# Patient Record
Sex: FEMALE | Race: OTHER
[De-identification: names, ages, dates, MRNs, and addresses within clinical notes are randomized per-mention and may not be internally consistent; named-entity substitution may affect disease eponyms.]

---

## 2018-08-07 ENCOUNTER — HOSPITAL ENCOUNTER (INPATIENT)
Dept: HOSPITAL 36 - ER | Age: 58
LOS: 4 days | Discharge: NURSING, REHAB OR LONG-TERM CARE | DRG: 720 | End: 2018-08-11
Attending: INTERNAL MEDICINE | Admitting: INTERNAL MEDICINE
Payer: MEDICAID

## 2018-08-07 DIAGNOSIS — E11.9: ICD-10-CM

## 2018-08-07 DIAGNOSIS — Z88.5: ICD-10-CM

## 2018-08-07 DIAGNOSIS — Z88.0: ICD-10-CM

## 2018-08-07 DIAGNOSIS — E87.0: ICD-10-CM

## 2018-08-07 DIAGNOSIS — E86.0: ICD-10-CM

## 2018-08-07 DIAGNOSIS — N28.9: ICD-10-CM

## 2018-08-07 DIAGNOSIS — E44.1: ICD-10-CM

## 2018-08-07 DIAGNOSIS — N39.0: ICD-10-CM

## 2018-08-07 DIAGNOSIS — Z87.11: ICD-10-CM

## 2018-08-07 DIAGNOSIS — K21.9: ICD-10-CM

## 2018-08-07 DIAGNOSIS — G40.909: ICD-10-CM

## 2018-08-07 DIAGNOSIS — B96.4: ICD-10-CM

## 2018-08-07 DIAGNOSIS — I25.10: ICD-10-CM

## 2018-08-07 DIAGNOSIS — I48.91: ICD-10-CM

## 2018-08-07 DIAGNOSIS — F03.90: ICD-10-CM

## 2018-08-07 DIAGNOSIS — M81.0: ICD-10-CM

## 2018-08-07 DIAGNOSIS — Z23: ICD-10-CM

## 2018-08-07 DIAGNOSIS — A41.9: Primary | ICD-10-CM

## 2018-08-07 DIAGNOSIS — D50.9: ICD-10-CM

## 2018-08-07 DIAGNOSIS — I10: ICD-10-CM

## 2018-08-07 DIAGNOSIS — G80.9: ICD-10-CM

## 2018-08-07 DIAGNOSIS — F32.9: ICD-10-CM

## 2018-08-07 DIAGNOSIS — K27.9: ICD-10-CM

## 2018-08-07 DIAGNOSIS — Z82.49: ICD-10-CM

## 2018-08-07 LAB
ALBUMIN SERPL-MCNC: 3.2 GM/DL (ref 3.7–5.3)
ALBUMIN/GLOB SERPL: 0.7 {RATIO} (ref 1–1.8)
ALP SERPL-CCNC: 72 U/L (ref 34–104)
ALT SERPL-CCNC: 15 U/L (ref 7–52)
AMYLASE SERPL-CCNC: 35 U/L (ref 29–103)
ANION GAP SERPL CALC-SCNC: 11.1 MMOL/L (ref 7–16)
APPEARANCE UR: (no result)
AST SERPL-CCNC: 13 U/L (ref 13–39)
BACTERIA #/AREA URNS HPF: (no result) /HPF
BASOPHILS # BLD AUTO: 0.1 TH/CUMM (ref 0–0.2)
BASOPHILS NFR BLD AUTO: 1.2 % (ref 0–2)
BILIRUB SERPL-MCNC: 0.5 MG/DL (ref 0.3–1)
BILIRUB UR-MCNC: NEGATIVE MG/DL
BUN SERPL-MCNC: 79 MG/DL (ref 7–25)
CALCIUM SERPL-MCNC: 9.9 MG/DL (ref 8.6–10.3)
CHLORIDE SERPL-SCNC: 108 MEQ/L (ref 98–107)
CK SERPL-CCNC: 11 U/L (ref 30–223)
CO2 SERPL-SCNC: 34 MEQ/L (ref 21–31)
COLOR UR: YELLOW
CREAT SERPL-MCNC: 2.7 MG/DL (ref 0.6–1.2)
EOSINOPHIL # BLD AUTO: 0 TH/CMM (ref 0.1–0.4)
EOSINOPHIL NFR BLD AUTO: 1 % (ref 0–5)
EPI CELLS URNS QL MICRO: (no result) /LPF
ERYTHROCYTE [DISTWIDTH] IN BLOOD BY AUTOMATED COUNT: 12.8 % (ref 11.5–20)
GLOBULIN SER-MCNC: 4.3 GM/DL
GLUCOSE SERPL-MCNC: 138 MG/DL (ref 70–105)
GLUCOSE UR STRIP-MCNC: NEGATIVE MG/DL
HCT VFR BLD CALC: 33.2 % (ref 41–60)
HGB BLD-MCNC: 11.3 GM/DL (ref 12–16)
INR PPP: 0.95 (ref 0.5–1.4)
KETONES UR STRIP-MCNC: NEGATIVE MG/DL
LEUKOCYTE ESTERASE UR-ACNC: (no result)
LIPASE SERPL-CCNC: 26 U/L (ref 11–82)
LYMPHOCYTE AB SER FC-ACNC: 0.7 TH/CMM (ref 1.5–3)
LYMPHOCYTES NFR BLD AUTO: 15.8 % (ref 20–50)
MCH RBC QN AUTO: 33 PG (ref 27–31)
MCHC RBC AUTO-ENTMCNC: 33.9 PG (ref 28–36)
MCV RBC AUTO: 97.4 FL (ref 81–100)
MICRO URNS: YES
MONOCYTES # BLD AUTO: 0.4 TH/CMM (ref 0.3–1)
MONOCYTES NFR BLD AUTO: 8.7 % (ref 2–10)
NEUTROPHILS # BLD: 3.1 TH/CMM (ref 1.8–8)
NEUTROPHILS NFR BLD AUTO: 73.3 % (ref 40–80)
NITRITE UR QL STRIP: NEGATIVE
PH UR STRIP: 8.5 [PH] (ref 4.6–8)
PLATELET # BLD: 88 TH/CMM (ref 150–400)
PMV BLD AUTO: 11.5 FL
POTASSIUM SERPL-SCNC: 4.1 MEQ/L (ref 3.5–5.1)
PROT UR STRIP-MCNC: 100 MG/DL
PROTHROMBIN TIME: 9.9 SECONDS (ref 9.5–11.5)
RBC # BLD AUTO: 3.41 MIL/CMM (ref 3.8–5.1)
RBC # UR STRIP: (no result) /UL
RBC #/AREA URNS HPF: (no result) /HPF (ref 0–5)
SODIUM SERPL-SCNC: 149 MEQ/L (ref 136–145)
SP GR UR STRIP: 1.01 (ref 1–1.03)
TROPONIN I SERPL-MCNC: 0.02 NG/ML (ref 0.01–0.05)
URINALYSIS COMPLETE PNL UR: (no result)
UROBILINOGEN UR STRIP-ACNC: 0.2 E.U./DL (ref 0.2–1)
WBC # BLD AUTO: 4.3 TH/CMM (ref 4.8–10.8)

## 2018-08-07 PROCEDURE — A4217 STERILE WATER/SALINE, 500 ML: HCPCS

## 2018-08-07 PROCEDURE — Z7610: HCPCS

## 2018-08-07 RX ADMIN — DEXTROSE AND SODIUM CHLORIDE SCH MLS/HR: 5; .45 INJECTION, SOLUTION INTRAVENOUS at 23:28

## 2018-08-07 NOTE — ED PHYSICIAN CHART
ED Chief Complaint/HPI





- Patient Information


Date Seen:: 08/07/18


Time Seen:: 17:00


Chief Complaint:: Fever


History of Present Illness:: 





onset x 2 days of fever, chills, weakness, and palpitations; no report of trauma

, H/As, S/T, neck pain, C/P, SOB, Abd. Pain, A/N/V/D/C, or urinary s/s


Allergies:: 


 Allergies











Allergy/AdvReac Type Severity Reaction Status Date / Time


 


codeine Allergy   Verified 08/07/18 16:57


 


Penicillins [PCN] Allergy   Verified 08/07/18 16:57











Historian:: Patient, EMS


Review:: Nurse's Note Reviewed, Old Chart Reviewed, EMS run form Reviewed





<Kevin Zaidi - Last Filed: 08/07/18 17:46>





- Patient Information


Allergies:: 


 Allergies











Allergy/AdvReac Type Severity Reaction Status Date / Time


 


codeine Allergy   Verified 08/07/18 16:57


 


Penicillins [PCN] Allergy   Verified 08/07/18 16:57














<Jennifer Azul - Last Filed: 08/10/18 20:23>





ED Review of Systems





- Review of Systems


General/Constitutional: Fever, Chills, No weight loss, Weakness, No diaphoresis

, No edema, No loss of appetite


Skin: No skin lesions, No rash, No bruising


Head: No headache, No light-headedness


Eyes: No loss of vision, No pain, No diplopia


ENT: No earache, No nasal drainage, No sore throat, No tinnitus


Neck: No neck pain, No swelling, No thyromegaly, No stiffness, No mass noted


Cardio Vascular: No chest pain, Palpitations, No PND, No orthopnea, No edema


Pulmonary: No SOB, No cough, No sputum, No wheezing


GI: No nausea, No vomiting, No diarrhea, No pain, No melena, No hematochezia, 

No constipation, No hematemesis


G/U: No dysuria, No frequency, No hematuria, No nacturia


Ob/Gyn: No vaginal discharge, No abnormal vaginal bleed, No contraction


Musculoskeletal: No bone or joint pain, No back pain, No muscle pain


Endocrine: No polyuria, No polydipsia


Psychiatric: No prior psych history, No depression, No anxiety, No suicidal 

ideation, No homicidal ideation, No auditory hallucination, No visual 

hallucination


Hematopoietic: No bruising, No lymphadenopathy


Allergic/Immuno: No urticaria, No angioedema


Neurological: No syncope, No focal symptoms, Weakness, No paresthesia, No 

headache, No seizure, No dizziness, Confusion, No vertigo





<Kevin Zaidi - Last Filed: 08/07/18 17:46>





ED Past Medical History





- Past Medical History


Obtainable: Yes


Past Medical History: HTN, DM, CAD, PUD/GERD, Seizures, Dementia, Other (CP)


Family History: HTN


Social History: Non Smoker, No Alcohol, No Drug Use, Single, Care Facility


Surgical History: PEG/GTube


Psychiatricy History: Dementia


Medication: Reviewed





<Kevin Zaidi - Last Filed: 08/07/18 17:46>





Family Medical History





- Family Member


  ** Mother


History Unknown: Yes





<Kevin Zaidi - Last Filed: 08/07/18 17:46>





ED Physical Exam





- Physical Examination


General/Constitutional: Awake, Well-developed, well-nourished, Alert, No 

distress, GCS 15, Non-toxic appearing, Ambulatory


Head: Atraumatic


Eyes: Lids, conjuctiva normal, PERRL, EOMI


Skin: Nl inspection, No rash, No skin lesions, No ecchymosis, Well hydrated, No 

lymphadenopathy


ENMT: External ears, nose nl, TM canals nl, Nasal exam nl, Lips, teeth, gums nl

, Oropharynx nl, Tonsils nl


Neck: Nontender, Full ROM w/o pain, No JVD, No nuchal rigidity, No bruit, No 

mass, No stridor


Respiratory: Nl effort/Exclusion, Clear to Auscultation, No Wheeze/Rhonchi/Rales


Cardio Vascular: No murmur, gallop, rubs, NL S1 S2, Carotid/Femoral/Distal 

pulses equal bilaterally


Other Cardio Vascular comments:: 





Irregular Irregular Rhythm


GI: No tenderness/rebounding/guarding, No organomegaly, No hernia, Normal BS's, 

Nondistended, No mass/bruits, No McBurney tenderness


: No CVA tenderness


Extremities: No tenderness or effusion, Full ROM, normal strength in all 

extremities, No edema, Normal digits & nails


Neuro/Psych: Alert/oriented, DTR's symmetric, Normal sensory exam, Normal motor 

strength, Judgement/insight normal, Mood normal, Normal gait, No focal deficits


Misc: Normal back, No paraspinal tenderness





<Kevin Zaidi - Last Filed: 08/07/18 17:46>





ED Labs/Radiology/EKG Results





- EKG Interpretations


EKG Time:: 17:14


Rate & Rhythm: 147; Atrial Fibrillation


Comments:: 





non-specific st-t changes





<Kevin Zaidi - Last Filed: 08/07/18 17:46>





- Lab Results


Results: 


 Laboratory Tests











  08/07/18 08/07/18 08/07/18





  16:48 17:25 17:25


 


WBC   4.3 L 


 


RBC   3.41 L 


 


Hgb   11.3 L 


 


Hct   33.2 L 


 


MCV   97.4 


 


MCH   33.0 H 


 


MCHC Differential   33.9 


 


RDW   12.8 


 


Plt Count   88 L 


 


MPV   11.5 


 


Neutrophils %   73.3 


 


Lymphocytes %   15.8 L 


 


Monocytes %   8.7 


 


Eosinophils %   1.0 


 


Basophils %   1.2 


 


PT    9.9


 


INR    0.95


 


PTT (Actin FS)    30.3


 


Sodium   


 


Potassium   


 


Chloride   


 


Carbon Dioxide   


 


Anion Gap   


 


BUN   


 


Creatinine   


 


Est GFR ( Amer)   


 


Est GFR (Non-Af Amer)   


 


BUN/Creatinine Ratio   


 


Glucose   


 


Whole Bld Lactic Acid   


 


Calcium   


 


Total Bilirubin   


 


AST   


 


ALT   


 


Alkaline Phosphatase   


 


Creatine Kinase   


 


Troponin I   


 


Total Protein   


 


Albumin   


 


Globulin   


 


Albumin/Globulin Ratio   


 


Amylase   


 


Lipase   


 


Serum Pregnancy, Qual  NEGATIVE  


 


Urine Source   


 


Urine Color   


 


Urine Clarity   


 


Urine pH   


 


Ur Specific Gravity   


 


Urine Protein   


 


Urine Glucose (UA)   


 


Urine Ketones   


 


Urine Blood   


 


Urine Nitrate   


 


Urine Bilirubin   


 


Urine Urobilinogen   


 


Ur Leukocyte Esterase   


 


Urine RBC   


 


Urine WBC   


 


Ur Epithelial Cells   


 


Urine Bacteria   














  08/07/18 08/07/18 08/07/18





  17:25 17:25 17:53


 


WBC   


 


RBC   


 


Hgb   


 


Hct   


 


MCV   


 


MCH   


 


MCHC Differential   


 


RDW   


 


Plt Count   


 


MPV   


 


Neutrophils %   


 


Lymphocytes %   


 


Monocytes %   


 


Eosinophils %   


 


Basophils %   


 


PT   


 


INR   


 


PTT (Actin FS)   


 


Sodium  149 H  


 


Potassium  4.1  


 


Chloride  108 H  


 


Carbon Dioxide  34.0 H  


 


Anion Gap  11.1  


 


BUN  79 H  


 


Creatinine  2.7 H  


 


Est GFR ( Amer)  23.4  


 


Est GFR (Non-Af Amer)  19.3  


 


BUN/Creatinine Ratio  29.3  


 


Glucose  138 H  


 


Whole Bld Lactic Acid   1.17 


 


Calcium  9.9  


 


Total Bilirubin  0.5  


 


AST  13  


 


ALT  15  


 


Alkaline Phosphatase  72  


 


Creatine Kinase  11 L  


 


Troponin I   0.02 


 


Total Protein  7.5  


 


Albumin  3.2 L  


 


Globulin  4.3  


 


Albumin/Globulin Ratio  0.7 L  


 


Amylase    35


 


Lipase    26


 


Serum Pregnancy, Qual   


 


Urine Source   


 


Urine Color   


 


Urine Clarity   


 


Urine pH   


 


Ur Specific Gravity   


 


Urine Protein   


 


Urine Glucose (UA)   


 


Urine Ketones   


 


Urine Blood   


 


Urine Nitrate   


 


Urine Bilirubin   


 


Urine Urobilinogen   


 


Ur Leukocyte Esterase   


 


Urine RBC   


 


Urine WBC   


 


Ur Epithelial Cells   


 


Urine Bacteria   














  08/07/18





  19:30


 


WBC 


 


RBC 


 


Hgb 


 


Hct 


 


MCV 


 


MCH 


 


MCHC Differential 


 


RDW 


 


Plt Count 


 


MPV 


 


Neutrophils % 


 


Lymphocytes % 


 


Monocytes % 


 


Eosinophils % 


 


Basophils % 


 


PT 


 


INR 


 


PTT (Actin FS) 


 


Sodium 


 


Potassium 


 


Chloride 


 


Carbon Dioxide 


 


Anion Gap 


 


BUN 


 


Creatinine 


 


Est GFR ( Amer) 


 


Est GFR (Non-Af Amer) 


 


BUN/Creatinine Ratio 


 


Glucose 


 


Whole Bld Lactic Acid 


 


Calcium 


 


Total Bilirubin 


 


AST 


 


ALT 


 


Alkaline Phosphatase 


 


Creatine Kinase 


 


Troponin I 


 


Total Protein 


 


Albumin 


 


Globulin 


 


Albumin/Globulin Ratio 


 


Amylase 


 


Lipase 


 


Serum Pregnancy, Qual 


 


Urine Source  CLEAN C


 


Urine Color  YELLOW


 


Urine Clarity  HAZY


 


Urine pH  8.5


 


Ur Specific Gravity  1.015


 


Urine Protein  100 H


 


Urine Glucose (UA)  NEGATIVE


 


Urine Ketones  NEGATIVE


 


Urine Blood  LARGE H


 


Urine Nitrate  NEGATIVE


 


Urine Bilirubin  NEGATIVE


 


Urine Urobilinogen  0.2


 


Ur Leukocyte Esterase  LARGE H


 


Urine RBC  5-10 H


 


Urine WBC  10-25 H


 


Ur Epithelial Cells  FEW


 


Urine Bacteria  3+ H














- Radiology Results


Results: 


CXR: left lower lung basal density, increased lung markings, cardiomegaly





<Jennifer Azul - Last Filed: 08/10/18 20:23>





ED Septic Shock





- .


Is Septic Shock (SBP<90, OR Lactate>4 mmol\L) present?: No





<Kevin Zaidi - Last Filed: 08/07/18 17:46>





- .


Is Septic Shock (SBP<90, OR Lactate>4 mmol\L) present?: No





<Jennifer Azul - Last Filed: 08/10/18 20:23>





ED Reassessment (Disposition)





- Reassessment


Reassessment Condition:: Improved





- Diagnosis


Diagnosis:: 





Fever; Palpitations; Atrial Fibrillation; Dehydration; Leukopenia; Anemia; Pre-

Renal Azotemia; Hypernatremia





<Kevin Zaidi - Last Filed: 08/07/18 17:46>





- Reassessment


Reassessment:: 


Urinary tract infection





- Patient Disposition


Discharge/Transfer:: Acute Care w/in this hosp


Admitting Medical Physician:: Santy Finn





<Jennifer Azul - Last Filed: 08/10/18 20:23>

## 2018-08-08 VITALS — DIASTOLIC BLOOD PRESSURE: 67 MMHG | SYSTOLIC BLOOD PRESSURE: 116 MMHG

## 2018-08-08 RX ADMIN — Medication SCH TAB: at 09:04

## 2018-08-08 RX ADMIN — PANTOPRAZOLE SODIUM SCH MG: 40 GRANULE, DELAYED RELEASE ORAL at 13:35

## 2018-08-08 RX ADMIN — DEXTROSE AND SODIUM CHLORIDE SCH MLS/HR: 5; .45 INJECTION, SOLUTION INTRAVENOUS at 12:24

## 2018-08-08 RX ADMIN — DEXTROSE AND SODIUM CHLORIDE SCH MLS/HR: 5; .45 INJECTION, SOLUTION INTRAVENOUS at 21:22

## 2018-08-08 RX ADMIN — DILTIAZEM HYDROCHLORIDE SCH MG: 30 TABLET, FILM COATED ORAL at 17:10

## 2018-08-08 NOTE — INTERNAL MEDICINE PROG NOTE
Internal Medicine Subjective





- Subjective


Service Date: 08/08/18 ([id number])





Internal Medicine Objective





- Results


Result Diagrams: 


 08/07/18 17:25





 08/07/18 17:25


Recent Labs: 


 Laboratory Last Values











WBC  4.3 Th/cmm (4.8-10.8)  L  08/07/18  17:25    


 


RBC  3.41 Mil/cmm (3.80-5.10)  L  08/07/18  17:25    


 


Hgb  11.3 gm/dL (12-16)  L  08/07/18  17:25    


 


Hct  33.2 % (41.0-60)  L  08/07/18  17:25    


 


MCV  97.4 fl ()   08/07/18  17:25    


 


MCH  33.0 pg (27.0-31.0)  H  08/07/18  17:25    


 


MCHC Differential  33.9 pg (28.0-36.0)   08/07/18  17:25    


 


RDW  12.8 % (11.5-20.0)   08/07/18  17:25    


 


Plt Count  88 Th/cmm (150-400)  L  08/07/18  17:25    


 


MPV  11.5 fl  08/07/18  17:25    


 


Neutrophils %  73.3 % (40.0-80.0)   08/07/18  17:25    


 


Lymphocytes %  15.8 % (20.0-50.0)  L  08/07/18  17:25    


 


Monocytes %  8.7 % (2.0-10.0)   08/07/18  17:25    


 


Eosinophils %  1.0 % (0.0-5.0)   08/07/18  17:25    


 


Basophils %  1.2 % (0.0-2.0)   08/07/18  17:25    


 


PT  9.9 SECONDS (9.5-11.5)   08/07/18  17:25    


 


INR  0.95  (0.5-1.4)   08/07/18  17:25    


 


PTT (Actin FS)  30.3 SECONDS (26.0-38.0)   08/07/18  17:25    


 


Sodium  149 mEq/L (136-145)  H  08/07/18  17:25    


 


Potassium  4.1 mEq/L (3.5-5.1)   08/07/18  17:25    


 


Chloride  108 mEq/L ()  H  08/07/18  17:25    


 


Carbon Dioxide  34.0 mEq/L (21.0-31.0)  H  08/07/18  17:25    


 


Anion Gap  11.1  (7.0-16.0)   08/07/18  17:25    


 


BUN  79 mg/dL (7-25)  H  08/07/18  17:25    


 


Creatinine  2.7 mg/dL (0.6-1.2)  H  08/07/18  17:25    


 


Est GFR ( Amer)  23.4 ml/min (>90)   08/07/18  17:25    


 


Est GFR (Non-Af Amer)  19.3 ml/min  08/07/18  17:25    


 


BUN/Creatinine Ratio  29.3   08/07/18  17:25    


 


Glucose  138 mg/dL ()  H  08/07/18  17:25    


 


Whole Bld Lactic Acid  1.17 mmol/L (0.60-1.99)   08/07/18  17:25    


 


Calcium  9.9 mg/dL (8.6-10.3)   08/07/18  17:25    


 


Total Bilirubin  0.5 mg/dL (0.3-1.0)   08/07/18  17:25    


 


AST  13 U/L (13-39)   08/07/18  17:25    


 


ALT  15 U/L (7-52)   08/07/18  17:25    


 


Alkaline Phosphatase  72 U/L ()   08/07/18  17:25    


 


Creatine Kinase  11 U/L ()  L  08/07/18  17:25    


 


Troponin I  0.02 ng/mL (0.01-0.05)   08/07/18  17:25    


 


Total Protein  7.5 gm/dL (6.0-8.3)   08/07/18  17:25    


 


Albumin  3.2 gm/dL (3.7-5.3)  L  08/07/18  17:25    


 


Globulin  4.3 gm/dL  08/07/18  17:25    


 


Albumin/Globulin Ratio  0.7  (1.0-1.8)  L  08/07/18  17:25    


 


Amylase  35 U/L ()   08/07/18  17:53    


 


Lipase  26 U/L (11-82)   08/07/18  17:53    


 


Serum Pregnancy, Qual  NEGATIVE  (NEGATIVE)   08/07/18  16:48    


 


Urine Source  CLEAN C   08/07/18  19:30    


 


Urine Color  YELLOW   08/07/18  19:30    


 


Urine Clarity  HAZY  (CLEAR)   08/07/18  19:30    


 


Urine pH  8.5  (4.6 - 8.0)   08/07/18  19:30    


 


Ur Specific Gravity  1.015  (1.005-1.030)   08/07/18  19:30    


 


Urine Protein  100 mg/dL (NEGATIVE)  H  08/07/18  19:30    


 


Urine Glucose (UA)  NEGATIVE mg/dL (NEGATIVE)   08/07/18  19:30    


 


Urine Ketones  NEGATIVE mg/dL (NEGATIVE)   08/07/18  19:30    


 


Urine Blood  LARGE  (NEGATIVE)  H  08/07/18  19:30    


 


Urine Nitrate  NEGATIVE  (NEGATIVE)   08/07/18  19:30    


 


Urine Bilirubin  NEGATIVE  (NEGATIVE)   08/07/18  19:30    


 


Urine Urobilinogen  0.2 E.U./dL (0.2 - 1.0)   08/07/18  19:30    


 


Ur Leukocyte Esterase  LARGE  (NEGATIVE)  H  08/07/18  19:30    


 


Urine RBC  5-10 /hpf (0-5)  H  08/07/18  19:30    


 


Urine WBC  10-25 /hpf (0-5)  H  08/07/18  19:30    


 


Ur Epithelial Cells  FEW /lpf (FEW)   08/07/18  19:30    


 


Urine Bacteria  3+ /hpf (NONE SEEN)  H  08/07/18  19:30    














- Physical Exam


Vitals and I&O: 


 Vital Signs











Temp  97.0 F   08/08/18 12:00


 


Pulse  118   08/08/18 12:00


 


Resp  17   08/08/18 12:00


 


BP  117/78   08/08/18 12:00


 


Pulse Ox  98   08/08/18 12:00








 Intake & Output











 08/07/18 08/08/18 08/08/18





 18:59 06:59 18:59


 


Intake Total  150 1000


 


Balance  150 1000


 


Weight (lbs) 185 lb 188 lb 


 


Intake:   


 


  Intake, IV Amount  150 1000


 


    D5-0.45NS 1,000 ml @ 100   1000





    mls/hr IV .Q10H JO Rx#:   





    [id number]   


 


    Levofloxacin 750mg/150mL  150 





    750 mg In 150 ml @ 100   





    mls/hr IV X1 ONE Rx#:   





    [id number]   


 


Other:   


 


  Weight Source Patient stated Patient stated 











Active Medications: 


Current Medications





Acetaminophen (Tylenol)  650 mg PO Q4H PRN


   PRN Reason: PAIN/TEM>100


   Stop: 10/06/18 21:08


Acetaminophen (Tylenol)  650 mg PO Q4H PRN


   PRN Reason: Pain Or Fever above 101


   Stop: 10/06/18 20:46


Al Hydrox/Mg Hydrox/Simethicone (Maalox)  30 ml PO Q6H PRN


   PRN Reason: Dyspepsia


   Stop: 10/06/18 20:43


Albuterol Sulfate (Albuterol 2.5mg/3ml Neb Ud)  2.5 mg HHN Q2HRT PRN


   PRN Reason: Shortness of Breath or Wheeze


   Stop: 10/06/18 20:43


Diltiazem HCl (Cardizem)  90 mg PO Q8HR [facility]


   Stop: 10/07/18 20:59


Docusate Sodium (Colace)  100 mg PO BID [facility]


   Stop: 10/07/18 08:59


   Last Admin: 08/08/18 09:04 Dose:  100 mg


Ferrous Sulfate (Iron)  450 mg GT DAILY [facility]


   Stop: 10/07/18 13:59


Guaifenesin (Robitussin)  200 mg PO Q4HR PRN


   PRN Reason: Cough or Congestion


   Stop: 10/06/18 20:43


Levofloxacin (Levaquin Pb)  500 mg in 100 mls @ 100 mls/hr IV Q48HR [facility]


   Stop: 10/08/18 19:59


Dextrose/Sodium Chloride (D5-0.45ns)  1,000 mls @ 100 mls/hr IV .Q10H JO


   Stop: 10/06/18 20:59


   Last Admin: 08/08/18 12:24 Dose:  100 mls/hr


Lacosamide (Vimpat)  200 mg GT BID [facility]


   Stop: 10/07/18 16:59


Lorazepam (Ativan)  1 mg IV Q4H PRN; Protocol


   PRN Reason: Seizure


   Stop: 10/06/18 20:43


Ondansetron HCl (Zofran)  4 mg IV Q8H PRN


   PRN Reason: Nausea / Vomiting


   Stop: 10/06/18 20:46


Pantoprazole Sodium (Protonix)  40 mg GT DAILY JO


   Stop: 10/07/18 13:59


Quetiapine Fumarate (Seroquel)  25 mg GT DAILY JO; Protocol


   Stop: 10/07/18 08:59


   Last Admin: 08/08/18 09:04 Dose:  25 mg


Senna (Senna)  17.2 mg GT HS JO


   Stop: 10/06/18 20:59


   Last Admin: 08/07/18 23:29 Dose:  17.2 mg

## 2018-08-08 NOTE — DIAGNOSTIC IMAGING REPORT
CHEST X-RAY: AP view



INDICATION: Fever



COMPARISON: None



FINDINGS: Increased interstitial lung markings are noted suggestive of

chronic lung changes.  Left lower lungs with subsegmental atelectasis is

noted.  Slight increased left basal density is noted.  Suboptimal lung

volume the noted.  Cardiomegaly is noted with atherosclerosis. 

Degenerative changes of spine are noted with scoliosis.  Degenerative

changes of the shoulders are also noted.



IMPRESSION:



Increased interstitial lung markings suggestive of chronic lung changes.

 No evidence of CHF.



Left lower lung zone subsegmental atelectasis versus scarring.



Left basal density probably related to superimposition of soft tissues. 

A small left pleural effusion is considered less likely.  Faint 

Infiltrate is also considered less likely.



Cardiomegaly and atherosclerotic vascular disease.

## 2018-08-08 NOTE — CONSULTATION
DATE OF CONSULTATION:  08/07/2018



The patient of Dr. Finn.



HISTORY AND PHYSICAL:  This is a 57-year-old female patient with cerebral palsy,

came to the Emergency Room because of weakness, tiredness, fever.  The patient

had uncontrolled atrial fibrillation and hence cardiac consult is requested. 

The patient is a poor historian.



PAST MEDICAL HISTORY:  Cerebral palsy, hypertension and atrial fibrillation,

peptic ulcer disease, seizure disorder, cerebral palsy, dementia, anemia.



FAMILY HISTORY:  Unremarkable.



SOCIAL HISTORY:  No history of smoking, alcohol abuse.



ALLERGIES:  No known allergies.



PHYSICAL EXAMINATION:

VITAL SIGNS:  Blood pressure 120/80; pulse 130, irregular; and respirations 28.

HEAD:  Normocephalic.  No lumps or bumps.

EYES:  Pupils equal, reactive to light.  Fundi show AV nicking, sclerae white,

conjunctivae pink.

NECK:  Carotid 2+.  Normal upstroke.  JVD flat.  Thyroid not palpable.

LYMPH NODES:  Not palpable.

CHEST:  Shows increased AP diameter.  No kyphosis, scoliosis.

LUNGS:  Bilateral rales.  Decreased breath sounds in both bases.

HEART:  PMI sixth intercostal space with lateral-to-midclavicular line.

EXTREMITIES:  Peripheral pulse is 1+, pedal edema 1+.



CLINICAL DIAGNOSES:

1.  Atrial fibrillation with rapid ventricular response.

2.  Cerebral palsy.

3.  Hypertension, peptic ulcer disease, seizure disorder, depression,

iron-deficiency anemia, osteoporosis.



PLAN:  The patient to continue present medication.  Continue present care.  The

patient will be given Cardizem, Lopressor IV for controlling the blood pressure,

also get an echocardiogram.





DD: 08/08/2018 14:26

DT: 08/08/2018 22:33

JOB# [id number]  [id number]

## 2018-08-08 NOTE — HISTORY & PHYSICAL
ADMIT DATE:  08/08/2018



CHIEF COMPLAINT:  Fever.



HISTORY OF PRESENT ILLNESS:  This is a 57-year-old female who is a resident of

[facility] who has a 2-day history of fevers associated with

weakness. For further management, the patient is now admitted here to the

telemetry unit.



PAST MEDICAL HISTORY:  Hypertension, diabetes, CAD, GERD, seizures, dementia,

cerebral palsy, and AFib.



FAMILY HISTORY:  Noncontributory.



SOCIAL HISTORY:  The patient is a nursing home resident requiring 24-hour

nursing care.



PAST SURGICAL HISTORY:  PEG.



MEDICATIONS:  Please see medication list.



REVIEW OF SYSTEMS:  Unable to obtain, the patient is nonverbal.



PHYSICAL EXAMINATION:

GENERAL:  The patient is well-developed, well-nourished, no apparent distress.

VITAL SIGNS:  Temperature 97.0, heart rate 118, blood pressure 117/78,

respirations 17, O2 98%.

HEENT:  Head normocephalic, atraumatic.

NECK:  Supple.  No mass.

LUNGS:  Clear bilaterally.

HEART:  Regular rate and rhythm. 

ABDOMEN:  Soft, nontender, nondistended.



LABORATORY DATA:  WBC 4.3, H and H 11.3 and 33.2, platelet 88.  Sodium 149,

potassium 4.1, chloride 108, BUN 79, creatinine 2.7, glucose of 138.



The patient had a urinalysis done, positive for UTI.



DIAGNOSTICS:  The patient had a chest x-ray done, impression is increased

interstitial lung marking suggestive of chronic lung changes, no evidence of

CHF, left lower lung zones subsegmental atelectasis versus scarring, left basal

density probably related to superimposition of the soft tissue.  Small left

pleural effusion considered less likely. Faint infiltrate is also considered

less likely, cardiomegaly and atherosclerotic vascular disease.



ASSESSMENT:  Acute urinary tract infection, sepsis, possible pneumonia,

tachycardia, history of atrial fibrillation, acute renal insufficiency, rule out

dehydration, leukopenia, acute urinary tract infection, mild protein-calorie

malnutrition, cerebral palsy, hypertension, diabetes, GERD, seizures, dementia.



PLAN:  The patient will be admitted to the telemetry unit.  We will get

cardiologist on the case due to aggressive IV fluids for hydration.  If the

patient's BUN and creatinine does not improve with IV fluids, we will add

Nephrology on the case.  In the meantime, we will order a renal ultrasound.  We

will keep the patient on empiric IV antibiotics of Levaquin.  We will send urine

for culture.  We will continue to follow this patient.





DD: 08/08/2018 13:32

DT: 08/08/2018 14:50

[facility]# [id number]  [id number]

## 2018-08-09 LAB
ANION GAP SERPL CALC-SCNC: 8 MMOL/L (ref 7–16)
BASOPHILS # BLD AUTO: 0 TH/CUMM (ref 0–0.2)
BASOPHILS NFR BLD AUTO: 0.6 % (ref 0–2)
BUN SERPL-MCNC: 67 MG/DL (ref 7–25)
CALCIUM SERPL-MCNC: 9.3 MG/DL (ref 8.6–10.3)
CHLORIDE SERPL-SCNC: 112 MEQ/L (ref 98–107)
CO2 SERPL-SCNC: 31 MEQ/L (ref 21–31)
CREAT SERPL-MCNC: 2 MG/DL (ref 0.6–1.2)
EOSINOPHIL # BLD AUTO: 0.2 TH/CMM (ref 0.1–0.4)
EOSINOPHIL NFR BLD AUTO: 3.5 % (ref 0–5)
ERYTHROCYTE [DISTWIDTH] IN BLOOD BY AUTOMATED COUNT: 12.6 % (ref 11.5–20)
GLUCOSE SERPL-MCNC: 136 MG/DL (ref 70–105)
HCT VFR BLD CALC: 28.2 % (ref 41–60)
HGB BLD-MCNC: 9.5 GM/DL (ref 12–16)
LYMPHOCYTE AB SER FC-ACNC: 0.5 TH/CMM (ref 1.5–3)
LYMPHOCYTES NFR BLD AUTO: 11.2 % (ref 20–50)
MAGNESIUM SERPL-MCNC: 2.2 MG/DL (ref 1.9–2.7)
MCH RBC QN AUTO: 33.3 PG (ref 27–31)
MCHC RBC AUTO-ENTMCNC: 33.9 PG (ref 28–36)
MCV RBC AUTO: 98.3 FL (ref 81–100)
MONOCYTES # BLD AUTO: 0.2 TH/CMM (ref 0.3–1)
MONOCYTES NFR BLD AUTO: 4.8 % (ref 2–10)
NEUTROPHILS # BLD: 4 TH/CMM (ref 1.8–8)
NEUTROPHILS NFR BLD AUTO: 79.9 % (ref 40–80)
PLATELET # BLD: 95 TH/CMM (ref 150–400)
PMV BLD AUTO: 12.8 FL
POTASSIUM SERPL-SCNC: 4 MEQ/L (ref 3.5–5.1)
RBC # BLD AUTO: 2.86 MIL/CMM (ref 3.8–5.1)
SODIUM SERPL-SCNC: 147 MEQ/L (ref 136–145)
WBC # BLD AUTO: 4.9 TH/CMM (ref 4.8–10.8)

## 2018-08-09 RX ADMIN — DILTIAZEM HYDROCHLORIDE SCH MG: 30 TABLET, FILM COATED ORAL at 18:15

## 2018-08-09 RX ADMIN — Medication SCH TAB: at 08:32

## 2018-08-09 RX ADMIN — DILTIAZEM HYDROCHLORIDE SCH MG: 30 TABLET, FILM COATED ORAL at 06:01

## 2018-08-09 RX ADMIN — DILTIAZEM HYDROCHLORIDE SCH MG: 30 TABLET, FILM COATED ORAL at 13:48

## 2018-08-09 RX ADMIN — DEXTROSE AND SODIUM CHLORIDE SCH MLS/HR: 5; .45 INJECTION, SOLUTION INTRAVENOUS at 13:56

## 2018-08-09 RX ADMIN — DEXTROSE AND SODIUM CHLORIDE SCH: 5; .45 INJECTION, SOLUTION INTRAVENOUS at 08:33

## 2018-08-09 RX ADMIN — PANTOPRAZOLE SODIUM SCH MG: 40 GRANULE, DELAYED RELEASE ORAL at 08:32

## 2018-08-09 RX ADMIN — DILTIAZEM HYDROCHLORIDE SCH MG: 30 TABLET, FILM COATED ORAL at 00:59

## 2018-08-09 NOTE — INTERNAL MEDICINE PROG NOTE
Internal Medicine Subjective





- Subjective


Service Date: 18


Patient seen and examined:: with staff


Patient is:: awake, non-verbal


Per staff patient has:: tolerating meds





Internal Medicine Objective





- Results


Result Diagrams: 


 18 04:45





 18 04:45


Recent Labs: 


 Laboratory Last Values











WBC  4.9 Th/cmm (4.8-10.8)   18  04:45    


 


RBC  2.86 Mil/cmm (3.80-5.10)  L  18  04:45    


 


Hgb  9.5 gm/dL (12-16)  L  18  04:45    


 


Hct  28.2 % (41.0-60)  L  18  04:45    


 


MCV  98.3 fl ()   18  04:45    


 


MCH  33.3 pg (27.0-31.0)  H  18  04:45    


 


MCHC Differential  33.9 pg (28.0-36.0)   18  04:45    


 


RDW  12.6 % (11.5-20.0)   18  04:45    


 


Plt Count  95 Th/cmm (150-400)  L  18  04:45    


 


MPV  12.8 fl  18  04:45    


 


Neutrophils %  79.9 % (40.0-80.0)   18  04:45    


 


Lymphocytes %  11.2 % (20.0-50.0)  L  18  04:45    


 


Monocytes %  4.8 % (2.0-10.0)   18  04:45    


 


Eosinophils %  3.5 % (0.0-5.0)   18  04:45    


 


Basophils %  0.6 % (0.0-2.0)   18  04:45    


 


PT  9.9 SECONDS (9.5-11.5)   18  17:25    


 


INR  0.95  (0.5-1.4)   18  17:25    


 


PTT (Actin FS)  30.3 SECONDS (26.0-38.0)   18  17:25    


 


Sodium  147 mEq/L (136-145)  H  18  04:45    


 


Potassium  4.0 mEq/L (3.5-5.1)   18  04:45    


 


Chloride  112 mEq/L ()  H  18  04:45    


 


Carbon Dioxide  31.0 mEq/L (21.0-31.0)   18  04:45    


 


Anion Gap  8.0  (7.0-16.0)   18  04:45    


 


BUN  67 mg/dL (7-25)  H  18  04:45    


 


Creatinine  2.0 mg/dL (0.6-1.2)  H  18  04:45    


 


Est GFR ( Amer)  33.0 ml/min (>90)   18  04:45    


 


Est GFR (Non-Af Amer)  27.3 ml/min  18  04:45    


 


BUN/Creatinine Ratio  33.5   18  04:45    


 


Glucose  136 mg/dL ()  H  18  04:45    


 


Whole Bld Lactic Acid  1.17 mmol/L (0.60-1.99)   18  17:25    


 


Calcium  9.3 mg/dL (8.6-10.3)   18  04:45    


 


Magnesium  2.2 mg/dL (1.9-2.7)   18  04:45    


 


Total Bilirubin  0.5 mg/dL (0.3-1.0)   18  17:25    


 


AST  13 U/L (13-39)   18  17:25    


 


ALT  15 U/L (7-52)   18  17:25    


 


Alkaline Phosphatase  72 U/L ()   18  17:25    


 


Ammonia  39 umol/L (16-53)   18  06:00    


 


Creatine Kinase  11 U/L ()  L  18  17:25    


 


Troponin I  0.02 ng/mL (0.01-0.05)   18  17:25    


 


B-Natriuretic Peptide  188.0 pg/mL (5.0-100.0)  H  18  04:45    


 


Total Protein  7.5 gm/dL (6.0-8.3)   18  17:25    


 


Albumin  3.2 gm/dL (3.7-5.3)  L  18  17:25    


 


Globulin  4.3 gm/dL  18  17:25    


 


Albumin/Globulin Ratio  0.7  (1.0-1.8)  L  18  17:25    


 


Amylase  35 U/L ()   18  17:53    


 


Lipase  26 U/L (11-82)   18  17:53    


 


Serum Pregnancy, Qual  NEGATIVE  (NEGATIVE)   18  16:48    


 


Urine Source  CLEAN C   18  19:30    


 


Urine Color  YELLOW   18  19:30    


 


Urine Clarity  HAZY  (CLEAR)   18  19:30    


 


Urine pH  8.5  (4.6 - 8.0)   18  19:30    


 


Ur Specific Gravity  1.015  (1.005-1.030)   18  19:30    


 


Urine Protein  100 mg/dL (NEGATIVE)  H  18  19:30    


 


Urine Glucose (UA)  NEGATIVE mg/dL (NEGATIVE)   18  19:30    


 


Urine Ketones  NEGATIVE mg/dL (NEGATIVE)   18  19:30    


 


Urine Blood  LARGE  (NEGATIVE)  H  18  19:30    


 


Urine Nitrate  NEGATIVE  (NEGATIVE)   18  19:30    


 


Urine Bilirubin  NEGATIVE  (NEGATIVE)   18  19:30    


 


Urine Urobilinogen  0.2 E.U./dL (0.2 - 1.0)   18  19:30    


 


Ur Leukocyte Esterase  LARGE  (NEGATIVE)  H  18  19:30    


 


Urine RBC  5-10 /hpf (0-5)  H  18  19:30    


 


Urine WBC  10-25 /hpf (0-5)  H  18  19:30    


 


Ur Epithelial Cells  FEW /lpf (FEW)   18  19:30    


 


Urine Bacteria  3+ /hpf (NONE SEEN)  H  18  19:30    














- Physical Exam


Vitals and I&O: 


 Vital Signs











Temp  97.0 F   18 11:59


 


Pulse  65   18 11:59


 


Resp  18   18 11:59


 


BP  112/63   18 11:59


 


Pulse Ox  93   18 11:59








 Intake & Output











 18





 18:59 06:59 18:59


 


Intake Total 2323.333 288.333 520


 


Balance 2323.333 288.333 520


 


Weight (lbs) 188 lb  188 lb


 


Intake:   


 


  Intake, IV Amount 1608.333 288.333 


 


    D5-0.45NS 1,000 ml @ 100 1608.333 288.333 





    mls/hr IV .Q10H [facility] Rx#:   





    [id number]   


 


  Tube Feeding 715  520


 


Other:   


 


  # Voids 3  3


 


  Weight Source Estimated  Bedscale











Active Medications: 


Current Medications





Acetaminophen (Tylenol)  650 mg PO Q4H PRN


   PRN Reason: PAIN/TEMP >100


   Stop: 10/06/18 21:08


Al Hydrox/Mg Hydrox/Simethicone (Maalox)  30 ml PO Q6H PRN


   PRN Reason: Dyspepsia


   Stop: 10/06/18 20:43


Albuterol Sulfate (Albuterol 2.5mg/3ml Neb Ud)  2.5 mg HHN Q2HRT PRN


   PRN Reason: Shortness of Breath or Wheeze


   Stop: 10/06/18 20:43


Amiodarone HCl (Cordarone)  200 mg PO DAILY [facility]


   Stop: 10/09/18 08:59


Diltiazem HCl (Cardizem)  60 mg PO Q6HR [facility]


   Stop: 10/07/18 17:59


   Last Admin: 18 06:01 Dose:  60 mg


Docusate Sodium (Colace)  100 mg PO BID [facility]


   Stop: 10/07/18 08:59


   Last Admin: 18 08:32 Dose:  100 mg


Ferrous Sulfate (Iron)  450 mg GT DAILY [facility]


   Stop: 10/07/18 13:59


   Last Admin: 18 08:31 Dose:  450 mg


Guaifenesin (Robitussin)  200 mg PO Q4HR PRN


   PRN Reason: Cough or Congestion


   Stop: 10/06/18 20:43


Levofloxacin (Levaquin Pb)  500 mg in 100 mls @ 100 mls/hr IV Q48HR [facility]


   Stop: 10/08/18 19:59


Dextrose/Sodium Chloride (D5-0.45ns)  1,000 mls @ 100 mls/hr IV .Q10H [facility]


   Stop: 10/06/18 20:59


   Last Admin: 18 08:33 Dose:  Not Given


Lacosamide (Vimpat)  200 mg GT BID [facility]


   Stop: 10/07/18 16:59


   Last Admin: 18 08:32 Dose:  200 mg


Lorazepam (Ativan)  1 mg IV Q4H PRN; Protocol


   PRN Reason: Seizure


   Stop: 10/06/18 20:43


Metoprolol Tartrate (Lopressor)  5 mg IV Q4HR PRN


   PRN Reason: Tachycardia HR above 120


   Stop: 10/07/18 15:59


Miscellaneous (Clinical Monitoring)  1 ea MC DAILY PRN


   PRN Reason: RENAL


   Stop: 10/07/18 15:45


Ondansetron HCl (Zofran)  4 mg IV Q8H PRN


   PRN Reason: Nausea / Vomiting


   Stop: 10/06/18 20:46


Pantoprazole Sodium (Protonix)  40 mg GT DAILY [facility]


   Stop: 10/07/18 13:59


   Last Admin: 18 08:32 Dose:  40 mg


Quetiapine Fumarate (Seroquel)  25 mg GT DAILY JO; Protocol


   Stop: 10/07/18 08:59


   Last Admin: 18 08:32 Dose:  25 mg


Senna (Senna)  17.2 mg GT HS JO


   Stop: 10/06/18 20:59


   Last Admin: 18 21:06 Dose:  17.2 mg








General: weak


HEENT: NC/AT, PERRLA


Neck: Supple


Lungs: CTAB


Cardiovascular: RRR, Normal S1, Normal S2, without murmur


Abdomen: soft, non-tender, non-distended, positive bowel sound


Extremities: excoriation


Neurological: unable to follow command





Internal Medicine Assmt/Plan





- Assessment


Assessment: 





acute uti


sepsis


possible pna


tachycardia


hx of afib


acute renal insufficiency r/o dehydration


leukopenia


mild protein erin malnutrition


cerebral palsy 


htn


dm


gerd


seizures


dementia








- Plan


Plan: 





contiue ivf for hydration


monitor HR


continue ivabx


follow up labs in am


continue current plan of care 





Nutritional Asmnt/Malnutr-PDOC





- Dietary Evaluation


Malnutrition Findings (Please click <Entered> for more info): 








Nutritional Asmnt/Malnutrition                             Start:  18 16:

02


Text:                                                      Status: Complete    

  


Freq:                                                                          

  


Protocol:                                                                      

  


 Document     18 16:44  LCKITG  (Rec: 18 17:06  SALUD  [id number])


 Nutritional Asmnt/Malnutrition


     Patient General Information


      Nutritional Screening                      High Risk


      Diagnosis                                  UTI, Sepsis


      Pertinent Medical Hx/Surgical Hx           HTN, DM, CAD, PUD/GERD,


                                                 Seizure, demntia, CP, PEG/


                                                 Gtube


      Subjective Information                     Pt was resting in bed at time


                                                 of visit, non-verbal. TF was


                                                 off at this time. Per RN, Pt


                                                 was tolerating TF well, no


                                                 residual noted. Wilner 11


                                                 noted.


      Current Diet Order/ Nutrition Support      Osmolite 1.5 at 65ml x 20hr


      Pertinent Medications                      D5-0.45ns, colace, iron,


                                                 levaquin, protonix, seroquel,


                                                 senna


      Pertinent Labs                              Na 149, Cl 108, BUN 79, Cr


                                                 2.7, glucose 138, Alb 3.2


     Nutritional Hx/Data


      Height                                     5 ft 4 in


      Height (Calculated Centimeters)            162.6


      Current Weight (lbs)                       188 lb


      Weight (Calculated Kilograms)              85.3


      Weight (Calculated Grams)                  29982.4


      Ideal Body Weight                          120


      Body Mass Index (BMI)                      32.2


      Weight Status                              Obese


     GI Symptoms


      GI Symptoms                                None


      Last BM                                    not indicated


      Difficult in:                              None


      Usual diet at home                         osmolite 1.5 at 65ml x 20hr at


                                                 care facility per chart


      Skin Integrity/Comment:                    intact


     Estimated Nutritional Goals


      BEE in Kcals:                              Adj wt of IBW


      Calories/Kcals/Kg                          25-30 based on 62kg


      Kcals Calculated                           8442-5446


      Protein:                                   Adj wt of IBW


      Protein g/k.2 monitor renal labs


      Protein Calculated                         74


      Fluid: ml                                  1550-1860ml (1ml/kcal)


     Nutritional Problem


      1. Problem


       Problem                                   altered nutrition related lab


       Etiology                                  electrolytes/fluid imbalance


                                                 and hx of DM


       Signs/Symptoms:                           Na 149, Cl 108, BUN 79, Cr 2.7


                                                 , glucose 138


     Malnutrition Alert


      Is there a minimum of two criteria         No


       selected?                                 


       Query Text:Check all the applicable       


       criteria. A minimum of two criteria are   


       recommended for diagnosis of either       


       severe or non-severe malnutrition.        


     Malnutrition Related to Morbid Obesity


      Malnutrition related to morbid obesity     No


     Intervention/Recommendation


      Comments                                   1. Continue with current TF


                                                 regimen. It provides 1950kcal,


                                                 82g protein, 990ml free water


                                                 , meeting 100% of nutritional


                                                 needs. If BUN continue high,


                                                 will consider lower TF rate.


                                                 2. Monitor TF rate, tolerance,


                                                 wt, skin integrity and labs


                                                 3. F/U as high risk in 2-3


                                                 days, 8/10-


     Expected Outcomes/Goals


      Expected Outcomes/Goals                    1. Pt to meet at least 75% of


                                                 nutritional needs via


                                                 nutrition support with


                                                 tolerance


                                                 2. Wt stability, skin to


                                                 remain intact, labs to


                                                 approach WNL.

## 2018-08-10 LAB
ANION GAP SERPL CALC-SCNC: 7.5 MMOL/L (ref 7–16)
BASOPHILS # BLD AUTO: 0 TH/CUMM (ref 0–0.2)
BASOPHILS NFR BLD AUTO: 0.8 % (ref 0–2)
BUN SERPL-MCNC: 54 MG/DL (ref 7–25)
CALCIUM SERPL-MCNC: 9.6 MG/DL (ref 8.6–10.3)
CHLORIDE SERPL-SCNC: 113 MEQ/L (ref 98–107)
CO2 SERPL-SCNC: 30.3 MEQ/L (ref 21–31)
CREAT SERPL-MCNC: 1.7 MG/DL (ref 0.6–1.2)
EOSINOPHIL # BLD AUTO: 0.2 TH/CMM (ref 0.1–0.4)
EOSINOPHIL NFR BLD AUTO: 2.7 % (ref 0–5)
ERYTHROCYTE [DISTWIDTH] IN BLOOD BY AUTOMATED COUNT: 12.1 % (ref 11.5–20)
GLUCOSE SERPL-MCNC: 115 MG/DL (ref 70–105)
HCT VFR BLD CALC: 29.1 % (ref 41–60)
HGB BLD-MCNC: 10 GM/DL (ref 12–16)
LYMPHOCYTE AB SER FC-ACNC: 0.6 TH/CMM (ref 1.5–3)
LYMPHOCYTES NFR BLD AUTO: 9.7 % (ref 20–50)
MCH RBC QN AUTO: 33.5 PG (ref 27–31)
MCHC RBC AUTO-ENTMCNC: 34.3 PG (ref 28–36)
MCV RBC AUTO: 97.7 FL (ref 81–100)
MONOCYTES # BLD AUTO: 0.2 TH/CMM (ref 0.3–1)
MONOCYTES NFR BLD AUTO: 2.8 % (ref 2–10)
NEUTROPHILS # BLD: 4.9 TH/CMM (ref 1.8–8)
NEUTROPHILS NFR BLD AUTO: 84 % (ref 40–80)
PLATELET # BLD: 129 TH/CMM (ref 150–400)
PMV BLD AUTO: 11.9 FL
POTASSIUM SERPL-SCNC: 3.8 MEQ/L (ref 3.5–5.1)
RBC # BLD AUTO: 2.98 MIL/CMM (ref 3.8–5.1)
SODIUM SERPL-SCNC: 147 MEQ/L (ref 136–145)
WBC # BLD AUTO: 5.9 TH/CMM (ref 4.8–10.8)

## 2018-08-10 RX ADMIN — DILTIAZEM HYDROCHLORIDE SCH MG: 30 TABLET, FILM COATED ORAL at 23:46

## 2018-08-10 RX ADMIN — Medication SCH TAB: at 08:42

## 2018-08-10 RX ADMIN — PANTOPRAZOLE SODIUM SCH MG: 40 GRANULE, DELAYED RELEASE ORAL at 08:43

## 2018-08-10 RX ADMIN — DEXTROSE AND SODIUM CHLORIDE SCH MLS/HR: 5; .45 INJECTION, SOLUTION INTRAVENOUS at 05:54

## 2018-08-10 RX ADMIN — DILTIAZEM HYDROCHLORIDE SCH MG: 30 TABLET, FILM COATED ORAL at 05:51

## 2018-08-10 RX ADMIN — DEXTROSE AND SODIUM CHLORIDE SCH MLS/HR: 5; .45 INJECTION, SOLUTION INTRAVENOUS at 12:58

## 2018-08-10 RX ADMIN — DILTIAZEM HYDROCHLORIDE SCH MG: 30 TABLET, FILM COATED ORAL at 00:30

## 2018-08-10 RX ADMIN — DILTIAZEM HYDROCHLORIDE SCH MG: 30 TABLET, FILM COATED ORAL at 17:15

## 2018-08-10 RX ADMIN — DILTIAZEM HYDROCHLORIDE SCH MG: 30 TABLET, FILM COATED ORAL at 12:13

## 2018-08-10 NOTE — INTERNAL MEDICINE PROG NOTE
Internal Medicine Subjective





- Subjective


Patient is:: awake, non-verbal


Per staff patient has:: tolerating meds





Internal Medicine Objective





- Results


Result Diagrams: 


 08/10/18 05:50





 08/10/18 05:50


Recent Labs: 


 Laboratory Last Values











WBC  5.9 Th/cmm (4.8-10.8)   08/10/18  05:50    


 


RBC  2.98 Mil/cmm (3.80-5.10)  L  08/10/18  05:50    


 


Hgb  10.0 gm/dL (12-16)  L  08/10/18  05:50    


 


Hct  29.1 % (41.0-60)  L  08/10/18  05:50    


 


MCV  97.7 fl ()   08/10/18  05:50    


 


MCH  33.5 pg (27.0-31.0)  H  08/10/18  05:50    


 


MCHC Differential  34.3 pg (28.0-36.0)   08/10/18  05:50    


 


RDW  12.1 % (11.5-20.0)   08/10/18  05:50    


 


Plt Count  129 Th/cmm (150-400)  L  08/10/18  05:50    


 


MPV  11.9 fl  08/10/18  05:50    


 


Neutrophils %  84.0 % (40.0-80.0)  H  08/10/18  05:50    


 


Lymphocytes %  9.7 % (20.0-50.0)  L  08/10/18  05:50    


 


Monocytes %  2.8 % (2.0-10.0)   08/10/18  05:50    


 


Eosinophils %  2.7 % (0.0-5.0)   08/10/18  05:50    


 


Basophils %  0.8 % (0.0-2.0)   08/10/18  05:50    


 


PT  9.9 SECONDS (9.5-11.5)   18  17:25    


 


INR  0.95  (0.5-1.4)   18  17:25    


 


PTT (Actin FS)  30.3 SECONDS (26.0-38.0)   18  17:25    


 


Sodium  147 mEq/L (136-145)  H  08/10/18  05:50    


 


Potassium  3.8 mEq/L (3.5-5.1)   08/10/18  05:50    


 


Chloride  113 mEq/L ()  H  08/10/18  05:50    


 


Carbon Dioxide  30.3 mEq/L (21.0-31.0)   08/10/18  05:50    


 


Anion Gap  7.5  (7.0-16.0)   08/10/18  05:50    


 


BUN  54 mg/dL (7-25)  H  08/10/18  05:50    


 


Creatinine  1.7 mg/dL (0.6-1.2)  H  08/10/18  05:50    


 


Est GFR ( Amer)  39.8 ml/min (>90)   08/10/18  05:50    


 


Est GFR (Non-Af Amer)  32.9 ml/min  08/10/18  05:50    


 


BUN/Creatinine Ratio  31.8   08/10/18  05:50    


 


Glucose  115 mg/dL ()  H  08/10/18  05:50    


 


Whole Bld Lactic Acid  1.17 mmol/L (0.60-1.99)   18  17:25    


 


Calcium  9.6 mg/dL (8.6-10.3)   08/10/18  05:50    


 


Magnesium  2.2 mg/dL (1.9-2.7)   18  04:45    


 


Total Bilirubin  0.5 mg/dL (0.3-1.0)   18  17:25    


 


AST  13 U/L (13-39)   18  17:25    


 


ALT  15 U/L (7-52)   18  17:25    


 


Alkaline Phosphatase  72 U/L ()   18  17:25    


 


Ammonia  39 umol/L (16-53)   18  06:00    


 


Creatine Kinase  11 U/L ()  L  18  17:25    


 


Troponin I  0.02 ng/mL (0.01-0.05)   18  17:25    


 


B-Natriuretic Peptide  188.0 pg/mL (5.0-100.0)  H  18  04:45    


 


Total Protein  7.5 gm/dL (6.0-8.3)   18  17:25    


 


Albumin  3.2 gm/dL (3.7-5.3)  L  18  17:25    


 


Globulin  4.3 gm/dL  18  17:25    


 


Albumin/Globulin Ratio  0.7  (1.0-1.8)  L  18  17:25    


 


Amylase  35 U/L ()   18  17:53    


 


Lipase  26 U/L (11-82)   18  17:53    


 


Serum Pregnancy, Qual  NEGATIVE  (NEGATIVE)   18  16:48    


 


Urine Source  CLEAN C   18  19:30    


 


Urine Color  YELLOW   18  19:30    


 


Urine Clarity  HAZY  (CLEAR)   18  19:30    


 


Urine pH  8.5  (4.6 - 8.0)   18  19:30    


 


Ur Specific Gravity  1.015  (1.005-1.030)   18  19:30    


 


Urine Protein  100 mg/dL (NEGATIVE)  H  18  19:30    


 


Urine Glucose (UA)  NEGATIVE mg/dL (NEGATIVE)   18  19:30    


 


Urine Ketones  NEGATIVE mg/dL (NEGATIVE)   18  19:30    


 


Urine Blood  LARGE  (NEGATIVE)  H  18  19:30    


 


Urine Nitrate  NEGATIVE  (NEGATIVE)   18  19:30    


 


Urine Bilirubin  NEGATIVE  (NEGATIVE)   18  19:30    


 


Urine Urobilinogen  0.2 E.U./dL (0.2 - 1.0)   18  19:30    


 


Ur Leukocyte Esterase  LARGE  (NEGATIVE)  H  18  19:30    


 


Urine RBC  5-10 /hpf (0-5)  H  18  19:30    


 


Urine WBC  10-25 /hpf (0-5)  H  18  19:30    


 


Ur Epithelial Cells  FEW /lpf (FEW)   18  19:30    


 


Urine Bacteria  3+ /hpf (NONE SEEN)  H  18  19:30    


 


Random Vancomycin  19.1 ug/mL (5.0-40.0)   08/10/18  05:50    














- Physical Exam


Vitals and I&O: 


 Vital Signs











Temp  97.6 F   08/10/18 04:00


 


Pulse  108   08/10/18 12:13


 


Resp  18   08/10/18 07:16


 


BP  142/74   08/10/18 12:13


 


Pulse Ox  96   08/10/18 07:16








 Intake & Output











 08/09/18 08/10/18 08/10/18





 18:59 06:59 18:59


 


Intake Total 1520 1455 


 


Balance 1520 1455 


 


Weight (lbs) 188 lb 195 lb 


 


Intake:   


 


  Intake, IV Amount 1000 1000 


 


    D5-0.45NS 1,000 ml @ 100 1000 1000 





    mls/hr IV .Q10H [facility] Rx#:   





    [id number]   


 


  Tube Feeding 520 455 


 


Other:   


 


  # Voids 3 3 


 


  Weight Source Bedscale Bedscale 











Active Medications: 


Current Medications





Acetaminophen (Tylenol)  650 mg PO Q4H PRN


   PRN Reason: PAIN/TEMP >100


   Stop: 10/06/18 21:08


Al Hydrox/Mg Hydrox/Simethicone (Maalox)  30 ml PO Q6H PRN


   PRN Reason: Dyspepsia


   Stop: 10/06/18 20:43


Albuterol Sulfate (Albuterol 2.5mg/3ml Neb Ud)  2.5 mg HHN Q2HRT PRN


   PRN Reason: Shortness of Breath or Wheeze


   Stop: 10/06/18 20:43


Amiodarone HCl (Cordarone)  200 mg PO DAILY [facility]


   Stop: 10/09/18 08:59


   Last Admin: 08/10/18 08:42 Dose:  200 mg


Carvedilol (Coreg)  6.25 mg PO DAILY [facility]


   Stop: 10/09/18 11:59


   Last Admin: 08/10/18 12:13 Dose:  6.25 mg


Diltiazem HCl (Cardizem)  60 mg PO Q6HR [facility]


   Stop: 10/07/18 17:59


   Last Admin: 08/10/18 12:13 Dose:  60 mg


Docusate Sodium (Colace)  100 mg PO BID [facility]


   Stop: 10/07/18 08:59


   Last Admin: 08/10/18 08:43 Dose:  100 mg


Ferrous Sulfate (Iron)  450 mg GT DAILY [facility]


   Stop: 10/07/18 13:59


   Last Admin: 08/10/18 08:43 Dose:  450 mg


Guaifenesin (Robitussin)  200 mg PO Q4HR PRN


   PRN Reason: Cough or Congestion


   Stop: 10/06/18 20:43


Vancomycin HCl 1 gm/ Sodium (Chloride)  250 mls @ 165 mls/hr IV ONCE ONE


   Stop: 08/10/18 13:30


   Last Admin: 08/10/18 11:07 Dose:  165 mls/hr


Dextrose/Sodium Chloride (D5-0.45ns)  1,000 mls @ 70 mls/hr IV .A50H20K [facility]


   Stop: 10/09/18 12:44


Lacosamide (Vimpat)  200 mg GT BID [facility]


   Stop: 10/07/18 16:59


   Last Admin: 08/10/18 08:43 Dose:  200 mg


Lorazepam (Ativan)  1 mg IV Q4H PRN; Protocol


   PRN Reason: Seizure


   Stop: 10/06/18 20:43


Metoprolol Tartrate (Lopressor)  5 mg IV Q4HR PRN


   PRN Reason: Tachycardia HR above 120


   Stop: 10/07/18 15:59


Miscellaneous (Clinical Monitoring)  1 [facility] DAILY PRN


   PRN Reason: RENAL


   Stop: 10/07/18 15:45


Ondansetron HCl (Zofran)  4 mg IV Q8H PRN


   PRN Reason: Nausea / Vomiting


   Stop: 10/06/18 20:46


Pantoprazole Sodium (Protonix)  40 mg GT DAILY [facility]


   Stop: 10/07/18 13:59


   Last Admin: 08/10/18 08:43 Dose:  40 mg


Quetiapine Fumarate (Seroquel)  25 mg GT DAILY [facility]; Protocol


   Stop: 10/07/18 08:59


   Last Admin: 08/10/18 08:42 Dose:  25 mg


Senna (Senna)  17.2 mg GT HS [facility]


   Stop: 10/06/18 20:59


   Last Admin: 18 21:44 Dose:  17.2 mg


Trimethoprim/Sulfamethoxazole (Bactrim Iv Per Pharmacy)  1 [facility] PRN JO


   Stop: 10/09/18 12:44








General: weak


HEENT: NC/AT, PERRLA


Neck: Supple


Lungs: CTAB


Cardiovascular: RRR, Normal S1, Normal S2, without murmur


Abdomen: soft, non-tender, non-distended, positive bowel sound


Extremities: excoriation


Neurological: unable to follow command





Internal Medicine Assmt/Plan





- Assessment


Assessment: 





acute uti with proteus mirabilis esbl and provedencia stuariti 


sepsis


possible pna


tachycardia


hx of afib


acute renal insufficiency r/o dehydration


leukopenia


mild protein erin malnutrition


cerebral palsy 


htn


dm


gerd


seizures


dementia








- Plan


Plan: 


switch ivabx to bactrim 


contiue ivf for hydration


monitor HR


follow up labs in am


continue current plan of care 





Nutritional Asmnt/Malnutr-PDOC





- Dietary Evaluation


Malnutrition Findings (Please click <Entered> for more info): 








Nutritional Asmnt/Malnutrition                             Start:  18 16:

02


Text:                                                      Status: Complete    

  


Freq:                                                                          

  


Protocol:                                                                      

  


 Document     18 16:44  LCKITG  (Rec: 18 17:06  [email or web address] [id number])


 Nutritional Asmnt/Malnutrition


     Patient General Information


      Nutritional Screening                      High Risk


      Diagnosis                                  UTI, Sepsis


      Pertinent Medical Hx/Surgical Hx           HTN, DM, CAD, PUD/GERD,


                                                 Seizure, demntia, CP, PEG/


                                                 Gtube


      Subjective Information                     Pt was resting in bed at time


                                                 of visit, non-verbal. TF was


                                                 off at this time. Per RN, Pt


                                                 was tolerating TF well, no


                                                 residual noted. Wilner 11


                                                 noted.


      Current Diet Order/ Nutrition Support      Osmolite 1.5 at 65ml x 20hr


      Pertinent Medications                      D5-0.45ns, colace, iron,


                                                 levaquin, protonix, seroquel,


                                                 senna


      Pertinent Labs                              Na 149, Cl 108, BUN 79, Cr


                                                 2.7, glucose 138, Alb 3.2


     Nutritional Hx/Data


      Height                                     5 ft 4 in


      Height (Calculated Centimeters)            162.6


      Current Weight (lbs)                       188 lb


      Weight (Calculated Kilograms)              85.3


      Weight (Calculated Grams)                  95461.4


      Ideal Body Weight                          120


      Body Mass Index (BMI)                      32.2


      Weight Status                              Obese


     GI Symptoms


      GI Symptoms                                None


      Last BM                                    not indicated


      Difficult in:                              None


      Usual diet at home                         osmolite 1.5 at 65ml x 20hr at


                                                 care facility per chart


      Skin Integrity/Comment:                    intact


     Estimated Nutritional Goals


      BEE in Kcals:                              Adj wt of IBW


      Calories/Kcals/Kg                          25-30 based on 62kg


      Kcals Calculated                           [phone]


      Protein:                                   Adj wt of IBW


      Protein g/k.2 monitor renal labs


      Protein Calculated                         74


      Fluid: ml                                  1550-1860ml (1ml/kcal)


     Nutritional Problem


      1. Problem


       Problem                                   altered nutrition related lab


       Etiology                                  electrolytes/fluid imbalance


                                                 and hx of DM


       Signs/Symptoms:                           Na 149, Cl 108, BUN 79, Cr 2.7


                                                 , glucose 138


     Malnutrition Alert


      Is there a minimum of two criteria         No


       selected?                                 


       Query Text:Check all the applicable       


       criteria. A minimum of two criteria are   


       recommended for diagnosis of either       


       severe or non-severe malnutrition.        


     Malnutrition Related to Morbid Obesity


      Malnutrition related to morbid obesity     No


     Intervention/Recommendation


      Comments                                   1. Continue with current TF


                                                 regimen. It provides 1950kcal,


                                                 82g protein, 990ml free water


                                                 , meeting 100% of nutritional


                                                 needs. If BUN continue high,


                                                 will consider lower TF rate.


                                                 2. Monitor TF rate, tolerance,


                                                 wt, skin integrity and labs


                                                 3. F/U as high risk in 2-3


                                                 days, 8/10-


     Expected Outcomes/Goals


      Expected Outcomes/Goals                    1. Pt to meet at least 75% of


                                                 nutritional needs via


                                                 nutrition support with


                                                 tolerance


                                                 2. Wt stability, skin to


                                                 remain intact, labs to


                                                 approach WNL.

## 2018-08-10 NOTE — CARDIOLOGY
08/09/2018



The patient of Dr. Santy Finn.



M-MODE ECHOCARDIOGRAM:  Mitral valve, anterior leaflet of mitral valve shows

normal excursion, EF velocity.  Posterior leaflet of the mitral valve shows

normal excursion.  Left ventricular posterior wall shows increased thickness,

normal excursion.  Interventricular septum shows increased thickness, normal

excursion, hypertrophy of the left ventricle, ejection fraction 50%.  Left

atrium enlarged 5.5 cm.  Aortic root shows normal dimension, normal excursion of

aortic leaflets.



CONCLUSION:  Hypertrophy of the left ventricle, ejection fraction 50%.  Left

atrial enlargement.



2D ECHO:  Long axis view showed normal-sized left ventricle with hypertrophy of

the left ventricle.  Left atrium enlarged.  Aortic root shows normal dimension,

normal excursion of aortic leaflets.  Short axis view of mitral valve normal. 

Short axis view of aortic valve normal.  Apical four chamber view showed

normal-sized left ventricle with hypertrophy of the left ventricle.  Left atrium

enlarged.  Right ventricular cavity, right atrium normal, no pericardial

effusion.  Ejection fraction 58%.



CONCLUSION:  Hypertrophy of the left ventricle, ejection fraction 50%.  Left

atrial enlargement.



Doppler study shows mild mitral regurgitation, mild tricuspid regurgitation,

right ventricular systolic pressure 44 mmHg.





DD: 08/10/2018 09:48

DT: 08/10/2018 11:51

JOB# [id number]  [id number]

## 2018-08-11 LAB
ANION GAP SERPL CALC-SCNC: 7.6 MMOL/L (ref 7–16)
BUN SERPL-MCNC: 48 MG/DL (ref 7–25)
CALCIUM SERPL-MCNC: 9.4 MG/DL (ref 8.6–10.3)
CHLORIDE SERPL-SCNC: 111 MEQ/L (ref 98–107)
CO2 SERPL-SCNC: 29.5 MEQ/L (ref 21–31)
CREAT SERPL-MCNC: 1.5 MG/DL (ref 0.6–1.2)
GLUCOSE SERPL-MCNC: 133 MG/DL (ref 70–105)
POTASSIUM SERPL-SCNC: 4.1 MEQ/L (ref 3.5–5.1)
SODIUM SERPL-SCNC: 144 MEQ/L (ref 136–145)

## 2018-08-11 RX ADMIN — Medication SCH TAB: at 08:49

## 2018-08-11 RX ADMIN — DILTIAZEM HYDROCHLORIDE SCH MG: 30 TABLET, FILM COATED ORAL at 05:42

## 2018-08-11 RX ADMIN — DEXTROSE AND SODIUM CHLORIDE SCH MLS/HR: 5; .45 INJECTION, SOLUTION INTRAVENOUS at 03:16

## 2018-08-11 RX ADMIN — DILTIAZEM HYDROCHLORIDE SCH MG: 30 TABLET, FILM COATED ORAL at 11:23

## 2018-08-11 RX ADMIN — PANTOPRAZOLE SODIUM SCH MG: 40 GRANULE, DELAYED RELEASE ORAL at 08:48

## 2018-08-11 NOTE — INTERNAL MEDICINE PROG NOTE
Internal Medicine Subjective





- Subjective


Service Date: 18 ([id number])


Patient is:: awake, non-verbal


Per staff patient has:: tolerating meds





Internal Medicine Objective





- Results


Result Diagrams: 


 08/10/18 05:50





 18 07:15


Recent Labs: 


 Laboratory Last Values











WBC  5.9 Th/cmm (4.8-10.8)   08/10/18  05:50    


 


RBC  2.98 Mil/cmm (3.80-5.10)  L  08/10/18  05:50    


 


Hgb  10.0 gm/dL (12-16)  L  08/10/18  05:50    


 


Hct  29.1 % (41.0-60)  L  08/10/18  05:50    


 


MCV  97.7 fl ()   08/10/18  05:50    


 


MCH  33.5 pg (27.0-31.0)  H  08/10/18  05:50    


 


MCHC Differential  34.3 pg (28.0-36.0)   08/10/18  05:50    


 


RDW  12.1 % (11.5-20.0)   08/10/18  05:50    


 


Plt Count  129 Th/cmm (150-400)  L  08/10/18  05:50    


 


MPV  11.9 fl  08/10/18  05:50    


 


Neutrophils %  84.0 % (40.0-80.0)  H  08/10/18  05:50    


 


Lymphocytes %  9.7 % (20.0-50.0)  L  08/10/18  05:50    


 


Monocytes %  2.8 % (2.0-10.0)   08/10/18  05:50    


 


Eosinophils %  2.7 % (0.0-5.0)   08/10/18  05:50    


 


Basophils %  0.8 % (0.0-2.0)   08/10/18  05:50    


 


PT  9.9 SECONDS (9.5-11.5)   18  17:25    


 


INR  0.95  (0.5-1.4)   18  17:25    


 


PTT (Actin FS)  30.3 SECONDS (26.0-38.0)   18  17:25    


 


Sodium  144 mEq/L (136-145)   18  07:15    


 


Potassium  4.1 mEq/L (3.5-5.1)   18  07:15    


 


Chloride  111 mEq/L ()  H  18  07:15    


 


Carbon Dioxide  29.5 mEq/L (21.0-31.0)   18  07:15    


 


Anion Gap  7.6  (7.0-16.0)   18  07:15    


 


BUN  48 mg/dL (7-25)  H  18  07:15    


 


Creatinine  1.5 mg/dL (0.6-1.2)  H  18  07:15    


 


Est GFR ( Amer)  46.0 ml/min (>90)   18  07:15    


 


Est GFR (Non-Af Amer)  38.0 ml/min  18  07:15    


 


BUN/Creatinine Ratio  32.0   18  07:15    


 


Glucose  133 mg/dL ()  H  18  07:15    


 


Whole Bld Lactic Acid  1.17 mmol/L (0.60-1.99)   18  17:25    


 


Calcium  9.4 mg/dL (8.6-10.3)   18  07:15    


 


Magnesium  2.2 mg/dL (1.9-2.7)   18  04:45    


 


Total Bilirubin  0.5 mg/dL (0.3-1.0)   18  17:25    


 


AST  13 U/L (13-39)   18  17:25    


 


ALT  15 U/L (7-52)   18  17:25    


 


Alkaline Phosphatase  72 U/L ()   18  17:25    


 


Ammonia  39 umol/L (16-53)   18  06:00    


 


Creatine Kinase  11 U/L ()  L  18  17:25    


 


Troponin I  0.02 ng/mL (0.01-0.05)   18  17:25    


 


B-Natriuretic Peptide  188.0 pg/mL (5.0-100.0)  H  18  04:45    


 


Total Protein  7.5 gm/dL (6.0-8.3)   18  17:25    


 


Albumin  3.2 gm/dL (3.7-5.3)  L  18  17:25    


 


Globulin  4.3 gm/dL  18  17:25    


 


Albumin/Globulin Ratio  0.7  (1.0-1.8)  L  18  17:25    


 


Amylase  35 U/L ()   18  17:53    


 


Lipase  26 U/L (11-82)   18  17:53    


 


Serum Pregnancy, Qual  NEGATIVE  (NEGATIVE)   18  16:48    


 


Urine Source  CLEAN C   18  19:30    


 


Urine Color  YELLOW   18  19:30    


 


Urine Clarity  HAZY  (CLEAR)   18  19:30    


 


Urine pH  8.5  (4.6 - 8.0)   18  19:30    


 


Ur Specific Gravity  1.015  (1.005-1.030)   18  19:30    


 


Urine Protein  100 mg/dL (NEGATIVE)  H  18  19:30    


 


Urine Glucose (UA)  NEGATIVE mg/dL (NEGATIVE)   18  19:30    


 


Urine Ketones  NEGATIVE mg/dL (NEGATIVE)   18  19:30    


 


Urine Blood  LARGE  (NEGATIVE)  H  18  19:30    


 


Urine Nitrate  NEGATIVE  (NEGATIVE)   18  19:30    


 


Urine Bilirubin  NEGATIVE  (NEGATIVE)   18  19:30    


 


Urine Urobilinogen  0.2 E.U./dL (0.2 - 1.0)   18  19:30    


 


Ur Leukocyte Esterase  LARGE  (NEGATIVE)  H  18  19:30    


 


Urine RBC  5-10 /hpf (0-5)  H  18  19:30    


 


Urine WBC  10-25 /hpf (0-5)  H  18  19:30    


 


Ur Epithelial Cells  FEW /lpf (FEW)   18  19:30    


 


Urine Bacteria  3+ /hpf (NONE SEEN)  H  18  19:30    


 


Random Vancomycin  19.1 ug/mL (5.0-40.0)   08/10/18  05:50    














- Physical Exam


Vitals and I&O: 


 Vital Signs











Temp  97.4 F   18 13:39


 


Pulse  98   18 13:39


 


Resp  20   18 13:39


 


BP  125/66   18 13:39


 


Pulse Ox  100   18 13:39








 Intake & Output











 08/10/18 08/11/18 08/11/18





 18:59 06:59 18:59


 


Intake Total 880 1820 301


 


Balance 880 1820 301


 


Weight (lbs) 195 lb 195 lb 


 


Intake:   


 


  Intake, IV Amount 100 1000 301


 


    D5-0.45NS 1,000 ml @ 70  1000 301





    mls/hr IV .[id number] JO Rx   





    #:[id number]   


 


    Meropenem 1 gm In Sodium 100  





    Chloride 0.9% 100 ml @   





    100 mls/hr IV Q12H JO Rx   





    #:[id number]   


 


  Tube Feeding 780 520 


 


  Other  300 


 


Other:   


 


  # Voids 3 2 


 


  # Bowel Movements 1 1 


 


  Weight Source Estimated Bedscale 











General: weak


HEENT: NC/AT, PERRLA


Neck: Supple


Lungs: CTAB


Cardiovascular: RRR, Normal S1, Normal S2, without murmur


Abdomen: soft, non-tender, non-distended, positive bowel sound


Extremities: excoriation


Neurological: unable to follow command





Internal Medicine Assmt/Plan





- Assessment


Assessment: 





acute uti with proteus mirabilis esbl and provedencia stuariti 


sepsis


possible pna


tachycardia


hx of afib


acute renal insufficiency r/o dehydration


leukopenia


mild protein erin malnutrition


cerebral palsy 


htn


dm


gerd


seizures


dementia








- Plan


Plan: 


switch ivabx to bactrim 


contiue ivf for hydration


monitor HR


follow up labs in am


continue current plan of care 





Nutritional Asmnt/Malnutr-PDOC





- Dietary Evaluation


Malnutrition Findings (Please click <Entered> for more info): 








Nutritional Asmnt/Malnutrition                             Start:  18 16:

02


Text:                                                      Status: Complete    

  


Freq:                                                                          

  


Protocol:                                                                      

  


 Document     18 16:44  LCHENG  (Rec: 18 17:06  HEN  [id number])


 Nutritional Asmnt/Malnutrition


     Patient General Information


      Nutritional Screening                      High Risk


      Diagnosis                                  UTI, Sepsis


      Pertinent Medical Hx/Surgical Hx           HTN, DM, CAD, PUD/GERD,


                                                 Seizure, demntia, CP, PEG/


                                                 Gtube


      Subjective Information                     Pt was resting in bed at time


                                                 of visit, non-verbal. TF was


                                                 off at this time. Per RN, Pt


                                                 was tolerating TF well, no


                                                 residual noted. Wilner 11


                                                 noted.


      Current Diet Order/ Nutrition Support      Osmolite 1.5 at 65ml x 20hr


      Pertinent Medications                      D5-0.45ns, colace, iron,


                                                 levaquin, protonix, seroquel,


                                                 senna


      Pertinent Labs                              Na 149, Cl 108, BUN 79, Cr


                                                 2.7, glucose 138, Alb 3.2


     Nutritional Hx/Data


      Height                                     5 ft 4 in


      Height (Calculated Centimeters)            162.6


      Current Weight (lbs)                       188 lb


      Weight (Calculated Kilograms)              85.3


      Weight (Calculated Grams)                  10392.4


      Ideal Body Weight                          120


      Body Mass Index (BMI)                      32.2


      Weight Status                              Obese


     GI Symptoms


      GI Symptoms                                None


      Last BM                                    not indicated


      Difficult in:                              None


      Usual diet at home                         osmolite 1.5 at 65ml x 20hr at


                                                 care facility per chart


      Skin Integrity/Comment:                    intact


     Estimated Nutritional Goals


      BEE in Kcals:                              Adj wt of IBW


      Calories/Kcals/Kg                          25-30 based on 62kg


      Kcals Calculated                           5071-4918


      Protein:                                   Adj wt of IBW


      Protein g/k.2 monitor renal labs


      Protein Calculated                         74


      Fluid: ml                                  1550-1860ml (1ml/kcal)


     Nutritional Problem


      1. Problem


       Problem                                   altered nutrition related lab


       Etiology                                  electrolytes/fluid imbalance


                                                 and hx of DM


       Signs/Symptoms:                           Na 149, Cl 108, BUN 79, Cr 2.7


                                                 , glucose 138


     Malnutrition Alert


      Is there a minimum of two criteria         No


       selected?                                 


       Query Text:Check all the applicable       


       criteria. A minimum of two criteria are   


       recommended for diagnosis of either       


       severe or non-severe malnutrition.        


     Malnutrition Related to Morbid Obesity


      Malnutrition related to morbid obesity     No


     Intervention/Recommendation


      Comments                                   1. Continue with current TF


                                                 regimen. It provides 1950kcal,


                                                 82g protein, 990ml free water


                                                 , meeting 100% of nutritional


                                                 needs. If BUN continue high,


                                                 will consider lower TF rate.


                                                 2. Monitor TF rate, tolerance,


                                                 wt, skin integrity and labs


                                                 3. F/U as high risk in 2-3


                                                 days, 8/10-


     Expected Outcomes/Goals


      Expected Outcomes/Goals                    1. Pt to meet at least 75% of


                                                 nutritional needs via


                                                 nutrition support with


                                                 tolerance


                                                 2. Wt stability, skin to


                                                 remain intact, labs to


                                                 approach WNL.

## 2018-08-11 NOTE — DISCHARGE SUMMARY
DATE OF DISCHARGE:  08/11/2018



FINAL DIAGNOSES:  Acute urinary tract infection with Providencia stuartii and

Proteus mirabilis and blood culture positive for Providencia stuartii; history

of atrial fibrillation; acute renal insufficiency, rule out dehydration, which

has been treated; mild protein-calorie malnutrition; cerebral palsy;

hypertension; diabetes; gastroesophageal reflux disease; seizures and dementia.



HISTORY OF PRESENT ILLNESS:  A 57-year-old female who is a resident of [facility], has a 2-day history of fevers associated with weakness. 

For further management, the patient was admitted to the telemetry unit.



PHYSICAL EXAMINATION:

GENERAL:  The patient is well developed, well nourished, no acute distress.

VITAL SIGNS:  Stable.

HEENT:  Head normocephalic and atraumatic.

NECK:  Supple.  No mass.

LUNGS:  Clear bilaterally.

CARDIOVASCULAR:  Regular rate and rhythm.

ABDOMEN:  Soft and nontender.



HOSPITAL COURSE:  During the hospital stay, the patient was admitted to the

telemetry unit.  The patient had a urine culture done and positive for

Providencia stuartii and Proteus mirabilis ESBL.  The patient was kept on IV

antibiotics and the patient also had a Cardiology consultation done due to

tachycardia.  The patient had a 2D echocardiogram done and impression is

hypertrophy of the left ventricle, ejection fraction of 50% and left atrial

enlargement.  The patient did not have any fevers during the hospital stay. 

White count was within normal limits.  For this reason, the patient was stable

for discharge.



CONDITION UPON DISCHARGE:  Fair.



DISPOSITION:  [facility].



DISCHARGE MEDICATIONS:  The patient to continue IV antibiotics of Merrem q. 12 x

7 days.





DD: 08/11/2018 16:44

DT: 08/11/2018 22:15

JOB# [id number]  [id number]